# Patient Record
Sex: FEMALE | Employment: OTHER | URBAN - METROPOLITAN AREA
[De-identification: names, ages, dates, MRNs, and addresses within clinical notes are randomized per-mention and may not be internally consistent; named-entity substitution may affect disease eponyms.]

---

## 2016-12-24 ASSESSMENT — ENCOUNTER SYMPTOMS
SLEEP DISTURBANCES DUE TO BREATHING: 0
CHILLS: 0
EYE IRRITATION: 1
HALLUCINATIONS: 0
CLAUDICATION: 0
LEG SWELLING: 0
DOUBLE VISION: 0
DIFFICULTY URINATING: 1
EYE REDNESS: 1
ARTHRALGIAS: 0
LIGHT-HEADEDNESS: 1
FLANK PAIN: 0
STIFFNESS: 0
DYSURIA: 0
HYPOTENSION: 1
MYALGIAS: 1
ALTERED TEMPERATURE REGULATION: 1
MUSCLE CRAMPS: 1
LEG PAIN: 1
INCREASED ENERGY: 1
HYPERTENSION: 0
MUSCLE WEAKNESS: 0
DECREASED APPETITE: 0
WEIGHT LOSS: 0
FEVER: 0
POLYDIPSIA: 0
NIGHT SWEATS: 1
POLYPHAGIA: 0
TACHYCARDIA: 0
NECK PAIN: 1
JOINT SWELLING: 0
SYNCOPE: 0
BACK PAIN: 1
EYE PAIN: 0
FATIGUE: 1
PALPITATIONS: 1
EYE WATERING: 0
HEMATURIA: 0
EXERCISE INTOLERANCE: 0
ORTHOPNEA: 0
WEIGHT GAIN: 0

## 2016-12-24 ASSESSMENT — ACTIVITIES OF DAILY LIVING (ADL)
DO_MEMBERS_OF_YOUR_HOUSEHOLD_USE_SAFETY_HELMETS?: Y
ARE_THERE_SMOKE_DETECTORS_IN_YOUR_HOME?: Y
DO_MEMBERS_OF_YOUR_HOUSEHOLD_USE_SUNSCREEN?: Y
DO_MEMBERS_OF_YOUR_HOUSEHOLD_WEAR_SEAT_BELTS?: Y
ARE_THERE_FIREARMS_IN_YOUR_HOME?: N

## 2017-01-02 ENCOUNTER — OFFICE VISIT (OUTPATIENT)
Dept: INTERNAL MEDICINE | Facility: CLINIC | Age: 67
End: 2017-01-02

## 2017-01-02 VITALS
HEART RATE: 82 BPM | WEIGHT: 125.7 LBS | BODY MASS INDEX: 22.27 KG/M2 | DIASTOLIC BLOOD PRESSURE: 75 MMHG | TEMPERATURE: 98.3 F | OXYGEN SATURATION: 99 % | SYSTOLIC BLOOD PRESSURE: 121 MMHG | HEIGHT: 63 IN

## 2017-01-02 DIAGNOSIS — R42 DIZZINESS: Primary | ICD-10-CM

## 2017-01-02 DIAGNOSIS — Z11.59 NEED FOR HEPATITIS C SCREENING TEST: ICD-10-CM

## 2017-01-02 DIAGNOSIS — D72.819 LEUKOPENIA, UNSPECIFIED TYPE: ICD-10-CM

## 2017-01-02 DIAGNOSIS — E29.1 ANDROGEN DEFICIENCY: ICD-10-CM

## 2017-01-02 RX ORDER — DIPHENOXYLATE HYDROCHLORIDE AND ATROPINE SULFATE 2.5; .025 MG/1; MG/1
1 TABLET ORAL DAILY
COMMUNITY
Start: 2015-10-19

## 2017-01-02 ASSESSMENT — ENCOUNTER SYMPTOMS
MEMORY LOSS: 0
BLOOD IN STOOL: 0
SWOLLEN GLANDS: 0
FATIGUE: 1
SKIN CHANGES: 0
WEIGHT LOSS: 0
COUGH: 0
POLYPHAGIA: 0
NAUSEA: 0
LOSS OF CONSCIOUSNESS: 0
POLYDIPSIA: 0
FEVER: 0
EXERCISE INTOLERANCE: 0
LEG PAIN: 1
POOR WOUND HEALING: 0
MYALGIAS: 1
PALPITATIONS: 1
LEG SWELLING: 0
SORE THROAT: 0
SMELL DISTURBANCE: 0
SPUTUM PRODUCTION: 0
HOT FLASHES: 0
ORTHOPNEA: 0
EYE REDNESS: 1
FLANK PAIN: 0
EYE PAIN: 0
ABDOMINAL PAIN: 0
SEIZURES: 0
WEIGHT GAIN: 0
SNORES LOUDLY: 0
EYE WATERING: 0
HOARSE VOICE: 0
PANIC: 0
MUSCLE WEAKNESS: 0
DIZZINESS: 0
DECREASED APPETITE: 0
CONSTIPATION: 0
NECK PAIN: 1
DIFFICULTY URINATING: 1
ALTERED TEMPERATURE REGULATION: 1
DIARRHEA: 0
CLAUDICATION: 0
HALLUCINATIONS: 0
DOUBLE VISION: 0
STIFFNESS: 0
JAUNDICE: 0
TREMORS: 0
SINUS PAIN: 0
HEARTBURN: 0
RESPIRATORY PAIN: 0
WHEEZING: 0
DYSPNEA ON EXERTION: 0
SINUS CONGESTION: 0
DYSURIA: 0
BRUISES/BLEEDS EASILY: 0
HEMATURIA: 0
NUMBNESS: 0
LIGHT-HEADEDNESS: 1
BLOATING: 0
SLEEP DISTURBANCES DUE TO BREATHING: 0
MUSCLE CRAMPS: 1
SYNCOPE: 0
NERVOUS/ANXIOUS: 0
HEADACHES: 0
HYPOTENSION: 1
VOMITING: 0
HYPERTENSION: 0
NAIL CHANGES: 0
COUGH DISTURBING SLEEP: 0
RECTAL BLEEDING: 0
TASTE DISTURBANCE: 0
SHORTNESS OF BREATH: 0
DEPRESSION: 0
HEMOPTYSIS: 0
PARALYSIS: 0
TROUBLE SWALLOWING: 0
BREAST MASS: 0
POSTURAL DYSPNEA: 0
EXTREMITY NUMBNESS: 0
JOINT SWELLING: 0
BREAST PAIN: 0
WEAKNESS: 0
ARTHRALGIAS: 0
EYE IRRITATION: 1
DECREASED LIBIDO: 0
TACHYCARDIA: 0
SPEECH CHANGE: 0
BACK PAIN: 1
DECREASED CONCENTRATION: 0
NECK MASS: 0
DISTURBANCES IN COORDINATION: 0
CHILLS: 0
TINGLING: 0
NIGHT SWEATS: 1
BOWEL INCONTINENCE: 0
INCREASED ENERGY: 1
RECTAL PAIN: 0
INSOMNIA: 0

## 2017-01-02 NOTE — MR AVS SNAPSHOT
After Visit Summary   1/2/2017    Roberta Joseph    MRN: [id number]           Patient Information     Date Of Birth          1950        Visit Information        Provider Department      1/2/2017 6:30 PM Natasha Nava MD [facility]        Today's Diagnoses     Dizziness    -  1     Need for hepatitis C screening test         Leukopenia, unspecified type         Androgen deficiency           Care Instructions    Primary Care Center Medication Refill Request Information:  * Please contact your pharmacy regarding ANY request for medication refills.  ** [facility] Prescription Fax = [phone]  * Please allow 3 business days for routine medication refills.  * Please allow 5 business days for controlled substance medication refills.     Primary Care Center Test Result notification information:  *You will be notified with in 7-10 days of your appointment day regarding the results of your test.  If you are on MyChart you will be notified as soon as the provider has reviewed the results and signed off on them.          Follow-ups after your visit        Your next 10 appointments already scheduled     Jan 09, 2017  1:30 PM   NEW GENERAL with Oneida Eaton MD   Eye Clinic ([facility])    [facility]  [address]  [facility] [address]   [phone]              Future tests that were ordered for you today     Open Future Orders        Priority Expected Expires Ordered    Hepatitis C Screen Reflex to HCV RNA Quant and Genotype Routine 1/2/2017 1/2/2018 1/2/2017    CBC with platelets differential Routine  1/2/2018 1/2/2017    Cortisol Routine  1/2/2018 1/2/2017    Adrenal corticotropin Routine  1/3/2018 1/2/2017    Aldosterone Routine  1/3/2018 1/2/2017    Human growth hormone Routine  1/3/2018 1/2/2017    Growth hormone binding protein Routine  1/3/2018 1/2/2017    CRP inflammation Routine  1/2/2018 1/2/2017    Erythrocyte  "sedimentation rate auto Routine  1/2/2018 1/2/2017    Antinuclear antibody screen by EIA Routine  1/2/2018 1/2/2017            Who to contact     Please call your clinic at [phone] to:    Ask questions about your health    Make or cancel appointments    Discuss your medicines    Learn about your test results    Speak to your doctor   If you have compliments or concerns about an experience at your clinic, or if you wish to file a complaint, please contact [facility] Patient Relations at [phone] or email us at [email or web address]         Additional Information About Your Visit        [facility] Information     [facility] gives you secure access to your electronic health record. If you see a primary care provider, you can also send messages to your care team and make appointments. If you have questions, please call your primary care clinic.  If you do not have a primary care provider, please call [phone] and they will assist you.      [facility] is an electronic gateway that provides easy, online access to your medical records. With [facility], you can request a clinic appointment, read your test results, renew a prescription or communicate with your care team.     To access your existing account, please contact your [facility] Clinic or call [phone] for assistance.        Your Vitals Were     Pulse Temperature Height BMI (Body Mass Index) Pulse Oximetry Breastfeeding?    82 98.3  F (36.8  C) (Oral) 1.597 m (5' 2.87\") 22.36 kg/m2 99% No       Blood Pressure from Last 3 Encounters:   01/02/17 121/75    Weight from Last 3 Encounters:   01/02/17 57.017 kg (125 lb 11.2 oz)               Primary Care Provider    [facility]       No address on file        Thank you!     Thank you for choosing [facility]  for your care. Our goal is always to provide you with excellent care. Hearing back from our patients is one " way we can continue to improve our services. Please take a few minutes to complete the written survey that you may receive in the mail after your visit with us. Thank you!             Your Updated Medication List - Protect others around you: Learn how to safely use, store and throw away your medicines at www.disposemymeds.org.          This list is accurate as of: 1/2/17  6:48 PM.  Always use your most recent med list.                   Brand Name Dispense Instructions for use    acetaminophen 325 MG tablet    TYLENOL         CALCITRATE 950 MG tablet   Generic drug:  calcium citrate          [employer] CALCIUM CITRATE +D3 MINI 200-250 MG-UNIT Tabs   Generic drug:  Calcium Citrate-Vitamin D      Take 2 tablets by mouth 2 times daily       eflornithine HCl 13.9 % Crea    VANIQA    30 g    Apply a thin layer once daily to the affected area with hair growth twice daily 8 hours apart       EFUDEX 5 % cream   Generic drug:  fluorouracil          metroNIDAZOLE 0.75 % topical gel    METROGEL         MULTI-VITAMINS Tabs      Take 1 tablet by mouth daily       MULTIVITAMIN ADULT PO          PARoxetine 10 MG tablet    PAXIL

## 2017-01-03 DIAGNOSIS — D72.819 LEUKOPENIA, UNSPECIFIED TYPE: ICD-10-CM

## 2017-01-03 DIAGNOSIS — R42 DIZZINESS: ICD-10-CM

## 2017-01-03 DIAGNOSIS — E29.1 ANDROGEN DEFICIENCY: ICD-10-CM

## 2017-01-03 DIAGNOSIS — Z11.59 NEED FOR HEPATITIS C SCREENING TEST: ICD-10-CM

## 2017-01-03 LAB
ACTH PLAS-MCNC: <10 PG/ML
BASOPHILS # BLD AUTO: 0.1 10E9/L (ref 0–0.2)
BASOPHILS NFR BLD AUTO: 1.3 %
CORTIS SERPL-MCNC: 9.1 UG/DL (ref 4–22)
CRP SERPL-MCNC: <2.9 MG/L (ref 0–8)
DIFFERENTIAL METHOD BLD: NORMAL
EOSINOPHIL # BLD AUTO: 0.5 10E9/L (ref 0–0.7)
EOSINOPHIL NFR BLD AUTO: 11.2 %
ERYTHROCYTE [DISTWIDTH] IN BLOOD BY AUTOMATED COUNT: 11.9 % (ref 10–15)
ERYTHROCYTE [SEDIMENTATION RATE] IN BLOOD BY WESTERGREN METHOD: 26 MM/H (ref 0–30)
GH SERPL-MCNC: 1.6 UG/L (ref 0–8)
HCT VFR BLD AUTO: 37.1 % (ref 35–47)
HCV AB SERPL QL IA: NORMAL
HGB BLD-MCNC: 12.1 G/DL (ref 11.7–15.7)
IMM GRANULOCYTES # BLD: 0 10E9/L (ref 0–0.4)
IMM GRANULOCYTES NFR BLD: 0.2 %
LYMPHOCYTES # BLD AUTO: 1.2 10E9/L (ref 0.8–5.3)
LYMPHOCYTES NFR BLD AUTO: 27.7 %
MCH RBC QN AUTO: 31.1 PG (ref 26.5–33)
MCHC RBC AUTO-ENTMCNC: 32.6 G/DL (ref 31.5–36.5)
MCV RBC AUTO: 95 FL (ref 78–100)
MONOCYTES # BLD AUTO: 0.5 10E9/L (ref 0–1.3)
MONOCYTES NFR BLD AUTO: 10 %
NEUTROPHILS # BLD AUTO: 2.2 10E9/L (ref 1.6–8.3)
NEUTROPHILS NFR BLD AUTO: 49.6 %
NRBC # BLD AUTO: 0 10*3/UL
NRBC BLD AUTO-RTO: 0 /100
PLATELET # BLD AUTO: 161 10E9/L (ref 150–450)
RBC # BLD AUTO: 3.89 10E12/L (ref 3.8–5.2)
WBC # BLD AUTO: 4.5 10E9/L (ref 4–11)

## 2017-01-03 NOTE — NURSING NOTE
Chief Complaint   Patient presents with     Establish Care     Patient here to establish care.       Rowan Cobos CMA at 6:16 PM on 1/2/2017.

## 2017-01-03 NOTE — PROGRESS NOTES
HPI:       Roberta Joseph is a 66 year old female who presents for the following  Patient presents with: Establish Care    Roberta Joseph is a 66 yoF who is here to establish care and also to have an evaluation of a low DHEAS level that was checked by dermatology. This was checked b/c of increased facial hair. There was concern based on this lab for possible adrenal insufficiency. Does feels shaky/weak at times. Is cold a lot. Struggles with dizziness/lightheadedness. Does wake up in the night drenched in sweat. This has been occurring for the past couple years.     Problem, Medication and Allergy Lists were reviewed and are current.  Patient is a new patient to this clinic and so  I reviewed/updated the Past Medical History, the Family History and the Social History.     Retired //, .          Review of Systems:   Review of Systems     Constitutional:  Positive for fatigue, night sweats and increased energy. Negative for fever, chills, weight loss, weight gain, decreased appetite, recent stressors, height loss, post-operative complications, incisional pain, hallucinations, hyperactivity and confused.   HENT:  Negative for ear pain, hearing loss, tinnitus, nosebleeds, trouble swallowing, hoarse voice, mouth sores, sore throat, ear discharge, tooth pain, gum tenderness, taste disturbance, smell disturbance, hearing aid, bleeding gums, dry mouth, sinus pain, sinus congestion and neck mass.    Eyes:  Positive for redness, eye dryness, floaters and eye irritation. Negative for double vision, pain, eye pain, decreased vision, eye watering, eye bulging, flashing lights, spots, strabismus, tunnel vision and jaundice.   Respiratory:   Negative for cough, hemoptysis, sputum production, shortness of breath, wheezing, sleep disturbances due to breathing, snores loudly, respiratory pain, dyspnea on exertion, cough disturbing sleep and postural dyspnea.    Cardiovascular:  Positive for  palpitations, hypotension, few scattered varicosities, leg pain and light-headedness. Negative for chest pain, dyspnea on exertion, orthopnea, claudication, leg swelling, fingers/toes turn blue, hypertension, syncope, history of heart murmur, chest pain on exertion, chest pain at rest, pacemaker, sleep disturbances due to breathing, tachycardia, exercise intolerance and edema.   Gastrointestinal:  Negative for heartburn, nausea, vomiting, abdominal pain, diarrhea, constipation, blood in stool, melena, rectal pain, bloating, hemorrhoids, bowel incontinence, jaundice, rectal bleeding, coffee ground emesis and change in stool.   Genitourinary:  Positive for urgency and difficulty urinating. Negative for bladder incontinence, dysuria, hematuria, flank pain, vaginal discharge, genital sores, dyspareunia, decreased libido, nocturia, voiding less frequently, arousal difficulty, abnormal vaginal bleeding, excessive menstruation, menstrual changes, hot flashes, vaginal dryness and postmenopausal bleeding.   Musculoskeletal:  Positive for myalgias, back pain, muscle cramps and neck pain. Negative for joint swelling, arthralgias, stiffness, bone pain, muscle weakness and fracture.   Skin:  Negative for nail changes, itching, poor wound healing, rash, hair changes, skin changes, acne, warts, poor wound healing, scarring, flaky skin, Raynaud's phenomenon, sensitivity to sunlight and skin thickening.   Neurological:  Positive for light-headedness. Negative for dizziness, tingling, tremors, speech change, seizures, loss of consciousness, weakness, numbness, headaches, disturbances in coordination, extremity numbness, memory loss, difficulty walking and paralysis.   Endo/Heme:  Negative for anemia, swollen glands and bruises/bleeds easily.   Psychiatric/Behavioral:  Negative for depression, hallucinations, memory loss, decreased concentration, mood swings and panic attacks.    Breast:  Negative for breast discharge, breast mass,  "breast pain and nipple retraction.   Endocrine:  Positive for altered temperature regulation.Negative for polyphagia, polydipsia, unwanted hair growth and change in facial hair.            Physical Exam:   /75 mmHg  Pulse 82  Temp(Src) 98.3  F (36.8  C) (Oral)  Ht 1.597 m (5' 2.87\")  Wt 57.017 kg (125 lb 11.2 oz)  BMI 22.36 kg/m2  SpO2 99%  Breastfeeding? No  Body mass index is 22.36 kg/(m^2).  Vitals were reviewed     Physical Exam   Constitutional: She is oriented to person, place, and time and well-developed, well-nourished, and in no distress. No distress.   HENT:   Head: Normocephalic and atraumatic.   Right Ear: Tympanic membrane normal.   Left Ear: Tympanic membrane normal.   Mouth/Throat: Oropharynx is clear and moist.   Eyes: Conjunctivae and EOM are normal. Pupils are equal, round, and reactive to light. No scleral icterus.   Neck: Neck supple. No thyromegaly present.   Cardiovascular: Normal rate, regular rhythm, normal heart sounds and intact distal pulses.  Exam reveals no gallop and no friction rub.    No murmur heard.  Pulmonary/Chest: Effort normal and breath sounds normal. No respiratory distress. She has no wheezes.   Abdominal: Soft. Bowel sounds are normal. She exhibits no distension. There is no tenderness.   Musculoskeletal: Normal range of motion. She exhibits no edema.   Lymphadenopathy:     She has no cervical adenopathy.   Neurological: She is alert and oriented to person, place, and time. She exhibits normal muscle tone. Gait normal. Coordination normal.   Skin: Skin is warm and dry. No rash noted. She is not diaphoretic.   Psychiatric: Mood and affect normal.           Results:     Pending, available in EPIC   Assessment and Plan     Roberta was seen today for [facility] care.. I suspect her low DHEAS level is of no clinical significance. She doesn't really have any other significant signs of adrenal insufficiency, although some minor symptoms may relate, so we will rule this " out. She also doesn't have any reasons that concern me regarding the night sweats (no fevers/chills, no weight changes, etc)    Diagnoses and all orders for this visit:    Dizziness  -     Cortisol; Future  -     Adrenal corticotropin; Future  -     Aldosterone; Future  -     Human growth hormone; Future  -     Growth hormone binding protein; Future  -     CRP inflammation; Future  -     Erythrocyte sedimentation rate auto; Future  -     Antinuclear antibody screen by EIA; Future    Need for hepatitis C screening test  -     Hepatitis C Screen Reflex to HCV RNA Quant and Genotype; Future    Leukopenia, unspecified type  -     CBC with platelets differential; Future    Androgen deficiency  -     Cortisol; Future  -     Adrenal corticotropin; Future  -     Aldosterone; Future  -     Human growth hormone; Future  -     Growth hormone binding protein; Future  -     CRP inflammation; Future  -     Erythrocyte sedimentation rate auto; Future  -     Antinuclear antibody screen by EIA; Future        Options for treatment and follow-up care were reviewed with the patient. Roberta Joseph engaged in the decision making process and verbalized understanding of the options discussed and agreed with the final plan.    Natasha Belcher MD  Jan 2, 2017

## 2017-01-03 NOTE — PATIENT INSTRUCTIONS
Primary Care Center Medication Refill Request Information:  * Please contact your pharmacy regarding ANY request for medication refills.  ** [facility] Prescription Fax = [phone]  * Please allow 3 business days for routine medication refills.  * Please allow 5 business days for controlled substance medication refills.     Primary Care Center Test Result notification information:  *You will be notified with in 7-10 days of your appointment day regarding the results of your test.  If you are on MyChart you will be notified as soon as the provider has reviewed the results and signed off on them.

## 2017-01-04 LAB
ALDOST SERPL-MCNC: 7.8 NG/DL
ANA SER QL IA: NORMAL

## 2017-01-09 ENCOUNTER — OFFICE VISIT (OUTPATIENT)
Dept: OPHTHALMOLOGY | Facility: CLINIC | Age: 67
End: 2017-01-09
Attending: OPHTHALMOLOGY
Payer: MEDICARE

## 2017-01-09 DIAGNOSIS — H52.4 PRESBYOPIA: ICD-10-CM

## 2017-01-09 DIAGNOSIS — H40.033 ANATOMICAL NARROW ANGLE OF BOTH EYES: ICD-10-CM

## 2017-01-09 DIAGNOSIS — H04.123 DRY EYES, BILATERAL: Primary | ICD-10-CM

## 2017-01-09 DIAGNOSIS — H52.203 HYPEROPIC ASTIGMATISM OF BOTH EYES: ICD-10-CM

## 2017-01-09 PROCEDURE — 99214 OFFICE O/P EST MOD 30 MIN: CPT | Mod: ZF

## 2017-01-09 PROCEDURE — 92015 DETERMINE REFRACTIVE STATE: CPT | Mod: ZF

## 2017-01-09 ASSESSMENT — CONF VISUAL FIELD
OS_NORMAL: 1
OD_NORMAL: 1

## 2017-01-09 ASSESSMENT — REFRACTION_WEARINGRX
OD_CYLINDER: +0.50
OS_ADD: +2.50
OS_AXIS: 120
OD_SPHERE: +3.25
OD_ADD: +2.50
OD_AXIS: 150
OS_SPHERE: +2.00
OS_CYLINDER: +1.00

## 2017-01-09 ASSESSMENT — GONIOSCOPY
OS_TEMPORAL: C35B
OD_NASAL: C35B
OS_INFERIOR: C35B
OD_INFERIOR: C35B
OD_TEMPORAL: C35B
OS_SUPERIOR: C35B
OS_NASAL: C35B
OD_SUPERIOR: C35B

## 2017-01-09 ASSESSMENT — REFRACTION_MANIFEST
OS_CYLINDER: +0.75
OD_CYLINDER: +0.50
OS_AXIS: 124
OD_ADD: +2.75
OS_ADD: +2.75
OS_SPHERE: +1.25
OD_AXIS: 155
OD_SPHERE: +2.75

## 2017-01-09 ASSESSMENT — EXTERNAL EXAM - LEFT EYE: OS_EXAM: NORMAL

## 2017-01-09 ASSESSMENT — TONOMETRY
OS_IOP_MMHG: 16
OD_IOP_MMHG: 17
IOP_METHOD: TONOPEN

## 2017-01-09 ASSESSMENT — EXTERNAL EXAM - RIGHT EYE: OD_EXAM: NORMAL

## 2017-01-09 ASSESSMENT — SLIT LAMP EXAM - LIDS
COMMENTS: MGD
COMMENTS: MGD

## 2017-01-09 ASSESSMENT — VISUAL ACUITY
OD_CC: 20/20
OS_CC: 20/25
METHOD: SNELLEN - LINEAR
OD_CC+: -1
OS_CC+: +/-
CORRECTION_TYPE: GLASSES

## 2017-01-09 ASSESSMENT — CUP TO DISC RATIO
OD_RATIO: 0.3
OS_RATIO: 0.4

## 2017-01-09 NOTE — NURSING NOTE
Chief Complaints and History of Present Illnesses   Patient presents with     Annual Eye Exam     decreased vision OU     HPI    Affected eye(s):  Both   Symptoms:     No blurred vision   Decreased vision   Dryness   Itching         Do you have eye pain now?:  No      Comments:  Pt has noticed she has to get closer to road signs to read them than she used to.  OU get dry and itchy. Does use lubricant tears but they don't seem to help much. Hot packs seem to help.  Esther Adams COA 1:43 PM January 9, 2017

## 2017-01-09 NOTE — PROGRESS NOTES
HPI  Roberta Joseph is a 66 year old female here for full eye exam. She notes bilateral eye dryness and redness. This has been present for many years. There is associated eye redness, most noticeable in the inner corner of the right eye. If she uses artificial tears and warm compresses, the eyes feel more comfortable, but it doesn't change the redness much. Having a humidifier in their home also helps. No flashes/floaters.    Her dermatologist in [place] was treating her for rosacea and mentioned she may have ocular rosacea. She is currently using topical metrogel on her face for this.    Assessment & Plan      (H04.123) Dry eyes, bilateral  (primary encounter diagnosis)  Comment: Largely evaporative with MGD and possible ocular rosacea.  Plan: Start with artificial tears, warm compresses, and fish oil. Can consider oral doxy or topical FML if not improved.    (H40.033) Anatomical narrow angle of both eyes  Comment: Not occludable on gonioscopy today.  Plan: Observe    (H52.213) Hyperopic astigmatism of both eyes  (H52.4) Presbyopia  Comment: Good vision with refraction  Plan: Given updated glasses Rx.      -----------------------------------------------------------------------------------    Patient disposition:   Return in about 1 year (around 1/9/2018). or sooner as needed.    Teaching statement:  I have confirmed the content of the chief complaint, history of present illness, review of systems, and past medical/surgical history sections as documented by others and edited the information as needed.    I have interviewed and examined the patient and confirm the pertinent findings.  I agree with the findings and plan as documented.    Oneida Eaton MD  Comprehensive Ophthalmology & Ocular Pathology  Department of Ophthalmology and Visual Neurosciences  [email or web address]  Pager [phone]

## 2017-01-11 LAB — Lab: NORMAL

## 2020-03-10 ENCOUNTER — HEALTH MAINTENANCE LETTER (OUTPATIENT)
Age: 70
End: 2020-03-10

## 2020-12-27 ENCOUNTER — HEALTH MAINTENANCE LETTER (OUTPATIENT)
Age: 70
End: 2020-12-27

## 2021-04-24 ENCOUNTER — HEALTH MAINTENANCE LETTER (OUTPATIENT)
Age: 71
End: 2021-04-24

## 2021-10-09 ENCOUNTER — HEALTH MAINTENANCE LETTER (OUTPATIENT)
Age: 71
End: 2021-10-09

## 2022-03-20 ENCOUNTER — HEALTH MAINTENANCE LETTER (OUTPATIENT)
Age: 72
End: 2022-03-20

## 2022-03-22 ENCOUNTER — TRANSFERRED RECORDS (OUTPATIENT)
Dept: HEALTH INFORMATION MANAGEMENT | Facility: CLINIC | Age: 72
End: 2022-03-22

## 2022-05-16 ENCOUNTER — HEALTH MAINTENANCE LETTER (OUTPATIENT)
Age: 72
End: 2022-05-16

## 2022-08-18 NOTE — PROGRESS NOTES
[facility] Dermatology Note  Encounter Date: Aug 23, 2022  Office Visit     Dermatology Problem List:  1.  NMSC  -BCC, left medial cheek/nasolabial fold, s/p biopsy 8/29/2012, s/p excision 9/10/2013  -SCC, right nose, s/p Mohs 6/1996  2. Actinic keratosis  -s/p cryotherapy  -Efudex (initiated 7/22/2008, has used multiple times since start date)  3. Rosacea  -Metrogel (initited 7/5/2007)  4. # Hirsutism - s/p vaniqa. 2016, normal work up        ____________________________________________    Assessment & Plan:    # History of nonmelanoma skin cancer  -yearly skin screenings recommended, see derm consult  -Discussed risks and benefits of blue light therapy for AKs as per her request. I did not see AKs today. Including but not limited to discomfort and pain with procedure, time for procedure, need for avoidance of sun exposure after treatment, expected irritation after treatment, risk of exuberant reaction.        # COVID in August  (Positive for both PCR and antigen tests saturday). Pt reports paxlovid rebound and congested  - After discussion and noted congestion we decided to bring her back to check these nasal spots within 2-4 weeks.     Procedures Performed:   - Cryotherapy procedure note, location(s): right chest and left forehead. After verbal consent and discussion of risks and benefits including, but not limited to, dyspigmentation/scar, blister, and pain, 2 lesion(s) was(were) treated with 1-2 mm freeze border for 1-2 cycles with liquid nitrogen. Post cryotherapy instructions were provided.    Follow-up: as scheduled    Staff and Scribe:     Scribe Disclosure:   I, Yosef Restrepo, am serving as a scribe to document services personally performed by this physician, Dr. Meka Holbrook, based on data collection and the provider's statements to me.     Provider Disclosure:   The documentation recorded by the scribe accurately reflects the services I personally performed and the decisions made by  me.    Meka Holbrook MD    Department of Dermatology  [facility]: Phone: [phone], Fax:[phone]  [facility]: Phone: [phone], Fax: [phone]      ____________________________________________    CC: Derm Problem (Concerned about dry flaky patches, rough spots, white heads, and lumps. Recheck right side of nose S/p Mohs for SCC DOS: 06/1996./Dr. Lillie Martinez recommended blue light therapy.  She would like a second opinion. )    HPI:  Ms. Roberta Joseph is a(n) 72 year old female who presents today as a return patient for a spot check.     Last seen on 12/15/16 for a skin check.     Today, patient notes dry flaky patches on the face. Notes concerning lesion on the nose near previous site of SCC. Notes rough concern on the upper lip, peeling skin on the cheeks, and pimple like growth on the left chin.     Has not been seen since 2016 here in our facility. Has been seeing dermatology in [place].     Patient has concern about blue light therapy recommended by Dr. Moreira. Notes Dr. Moreira found precancers on the face. Last saw her in early 2022. Has COVID in August (PCR and antigen tests).     Patient is otherwise feeling well, without additional skin concerns.    Labs Reviewed:  N/A    Physical Exam:  Vitals: There were no vitals taken for this visit.  SKIN: Focused examination of forehead and chest was performed.Pt congested  - There are tan to brown waxy stuck on papules with surrounding erythema on the right chest and left forehead.    - No other lesions of concern on areas examined.     Medications:  Current Outpatient Medications   Medication     acetaminophen (TYLENOL) 325 MG tablet     calcium citrate (CALCITRATE) 950 MG tablet     Calcium Citrate-Vitamin D (CVS CALCIUM CITRATE +D3 MINI) 200-250 MG-UNIT TABS     eflornithine HCl (VANIQA) 13.9 % CREA     eflornithine HCl  13.9 % CREA     fluorouracil (EFUDEX) 5 % cream     metroNIDAZOLE (METROGEL) 0.75 % topical gel     Multiple Vitamin (MULTI-VITAMINS) TABS     Multiple Vitamins-Minerals (MULTIVITAMIN ADULT PO)     PARoxetine (PAXIL) 10 MG tablet     No current facility-administered medications for this visit.      Past Medical History:   Patient Active Problem List   Diagnosis     Dry eyes, bilateral     Hyperopic astigmatism of both eyes     Presbyopia     Past Medical History:   Diagnosis Date     Basal cell carcinoma     2013, left medial cheek     Depression      NCGS (non-celiac gluten sensitivity)      Osteoporosis      Squamous cell carcinoma     s/p Mohs 1996        CC Referred Self, MD  No address on file on close of this encounter.

## 2022-08-22 ENCOUNTER — NURSE TRIAGE (OUTPATIENT)
Dept: NURSING | Facility: CLINIC | Age: 72
End: 2022-08-22

## 2022-08-22 NOTE — TELEPHONE ENCOUNTER
No need for patient to reschedule.  She will mask as guidelines indicate and staff can wear appropriate PPE.  Jessica Ramirez RN     Intermediate / Complex Repair - Final Wound Length In Cm: 4

## 2022-08-22 NOTE — TELEPHONE ENCOUNTER
RN called patient.  NO sx at this time and feeling well.  Advised will need to wear mask, and will wear or PPE.  Pt agreed with plan will come for visit tomorrow.  She has completed a round of quarantine already and stated this could be rebound Covid from the Paxlovid treatment.  Pt agreed with plan and will come for the visit.      Jessica Ramirez RN

## 2022-08-22 NOTE — TELEPHONE ENCOUNTER
Had covid and was put on paxlovid. Finished the paxlovid and since then having some symptoms again so she retested Saturday and was positive again after testing negative.  Rebound covid from being on paxlovid? She does report symptoms are improving daily.    She is asking if she should be seen in clinic tomorrow for derm appointment?  [facility] says to retest and quarantine and  says she already completed one quarantine so she just needs to wear a mask.    Would nya like her to reschedule?    Please have care team return her call if she needs to reschedule or not.    Fariha Sanchez RN  [facility] Nurse Advisor        Reason for Disposition    [1] PERSISTING SYMPTOMS OF COVID-19 AND [2] symptoms BETTER (improving)    Additional Information    Negative: SEVERE difficulty breathing (e.g., struggling for each breath, speaks in single words)    Negative: [1] SEVERE weakness (e.g., can't stand or can barely walk) AND [2] new-onset or WORSE    Negative: Difficult to awaken or acting confused (e.g., disoriented, slurred speech)    Negative: Bluish (or gray) lips or face now    Negative: Sounds like a life-threatening emergency to the triager    Negative: [1] MODERATE difficulty breathing (e.g., speaks in phrases, SOB even at rest, pulse 100-120) AND [2] new-onset or WORSE    Negative: [1] MODERATE difficulty breathing AND [2] oxygen level (e.g., pulse oximetry) 91 to 94 percent    Negative: Oxygen level (e.g., pulse oximetry) 90 percent or lower    Negative: MODERATE difficulty breathing (e.g., speaks in phrases, SOB even at rest, pulse 100-120)    Negative: [1] Drinking very little AND [2] dehydration suspected (e.g., no urine > 12 hours, very dry mouth, very lightheaded)    Negative: Patient sounds very sick or weak to the triager    Negative: [1] MILD difficulty breathing (e.g., minimal/no SOB at rest, SOB with walking, pulse <100) AND [2] new-onset    Negative: Oxygen level (e.g., pulse oximetry) 91 to 94  percent    Negative: [1] PERSISTING SYMPTOMS OF COVID-19 AND [2] NEW symptom AND [3] could be serious    Negative: [1] Caller has URGENT question AND [2] triager unable to answer question    Negative: [1] PERSISTING SYMPTOMS OF COVID-19 AND [2] symptoms WORSE    Negative: [1] Caller has NON-URGENT question AND [2] triager unable to answer    Negative: [1] PERSISTING SYMPTOMS OF COVID-19 AND [2] NO medical visit for COVID-19 in past 2 weeks    Negative: Patient wants to be seen    Negative: [1] PERSISTING SYMPTOMS OF COVID-19 AND [2] symptoms SAME AND [3] medical visit for COVID-19 in past 2 weeks    Protocols used: CORONAVIRUS (COVID-19) PERSISTING SYMPTOMS FOLLOW-UP CALL-A-OH

## 2022-08-23 ENCOUNTER — OFFICE VISIT (OUTPATIENT)
Dept: DERMATOLOGY | Facility: CLINIC | Age: 72
End: 2022-08-23
Payer: COMMERCIAL

## 2022-08-23 DIAGNOSIS — L82.0 SEBORRHEIC KERATOSIS, INFLAMED: Primary | ICD-10-CM

## 2022-08-23 DIAGNOSIS — L71.9 ROSACEA: ICD-10-CM

## 2022-08-23 DIAGNOSIS — L68.0 HIRSUTISM: ICD-10-CM

## 2022-08-23 PROCEDURE — 99202 OFFICE O/P NEW SF 15 MIN: CPT | Mod: 25 | Performed by: DERMATOLOGY

## 2022-08-23 PROCEDURE — 17110 DESTRUCTION B9 LES UP TO 14: CPT | Performed by: DERMATOLOGY

## 2022-08-23 RX ORDER — ESCITALOPRAM OXALATE 20 MG/1
20 TABLET ORAL
COMMUNITY
Start: 2021-08-31

## 2022-08-23 RX ORDER — NABUMETONE 500 MG/1
500 TABLET, FILM COATED ORAL
COMMUNITY
Start: 2022-03-15

## 2022-08-23 ASSESSMENT — PAIN SCALES - GENERAL: PAINLEVEL: NO PAIN (0)

## 2022-08-23 NOTE — NURSING NOTE
Roberta Joseph's chief complaint for this visit includes:  Chief Complaint   Patient presents with     Derm Problem     Concerned about dry flaky patches, rough spots, white heads, and lumps. Recheck right side of nose S/p Mohs for SCC DOS: 06/1996.  Dr. Lillie Martinez recommended blue light therapy.  She would like a second opinion.      PCP: [facility]    Referring Provider:  Referred Self, MD  No address on file    There were no vitals taken for this visit.  No Pain (0)        Allergies   Allergen Reactions     Gluten Meal      Lactose GI Disturbance and Diarrhea         Do you need any medication refills at today's visit? No    Priya Arreola, CMA

## 2022-08-23 NOTE — PATIENT INSTRUCTIONS
Cryotherapy    What is it?  Use of a very cold liquid, such as liquid nitrogen, to freeze and destroy abnormal skin cells that need to be removed    What should I expect?  Tenderness and redness  A small blister that might grow and fill with dark purple blood. There may be crusting.  More than one treatment may be needed if the lesions do not go away.    How do I care for the treated area?  Gently wash the area with your hands when bathing.  Use a thin layer of Vaseline to help with healing. You may use a Band-Aid.   The area should heal within 7-10 days and may leave behind a pink or lighter color.   Do not use an antibiotic or Neosporin ointment.   You may take acetaminophen (Tylenol) for pain.     Call your doctor if you have:  Severe pain  Signs of infection (warmth, redness, cloudy yellow drainage, and or a bad smell)  Questions or concerns    Who should I call with questions?      [facility]: [phone]      [facility]: [phone]      For urgent needs outside of business hours call the [facility] at [phone] and ask for the dermatology resident on call

## 2022-08-23 NOTE — LETTER
8/23/2022         RE: Roberta Joseph  [address]  [address]  [place] [address]        Dear Colleague,    Thank you for referring your patient, Roberta Joseph, to the [facility]. Please see a copy of my visit note below.    [facility] Dermatology Note  Encounter Date: Aug 23, 2022  Office Visit     Dermatology Problem List:  1.  NMSC  -BCC, left medial cheek/nasolabial fold, s/p biopsy 8/29/2012, s/p excision 9/10/2013  -SCC, right nose, s/p Mohs 6/1996  2. Actinic keratosis  -s/p cryotherapy  -Efudex (initiated 7/22/2008, has used multiple times since start date)  3. Rosacea  -Metrogel (initited 7/5/2007)  4. # Hirsutism - s/p vaniqa. 2016, normal work up        ____________________________________________    Assessment & Plan:    # History of nonmelanoma skin cancer  -yearly skin screenings recommended, see derm consult  -Discussed risks and benefits of blue light therapy for AKs as per her request. I did not see AKs today. Including but not limited to discomfort and pain with procedure, time for procedure, need for avoidance of sun exposure after treatment, expected irritation after treatment, risk of exuberant reaction.        # COVID in August  (Positive for both PCR and antigen tests saturday). Pt reports paxlovid rebound and congested  - After discussion and noted congestion we decided to bring her back to check these nasal spots within 2-4 weeks.     Procedures Performed:   - Cryotherapy procedure note, location(s): right chest and left forehead. After verbal consent and discussion of risks and benefits including, but not limited to, dyspigmentation/scar, blister, and pain, 2 lesion(s) was(were) treated with 1-2 mm freeze border for 1-2 cycles with liquid nitrogen. Post cryotherapy instructions were provided.    Follow-up: as scheduled    Staff and Scribe:     Scribe Disclosure:   I, Yosef Restrepo, am serving as a scribe to document services personally  performed by this physician, Dr. Meka Holbrook, based on data collection and the provider's statements to me.     Provider Disclosure:   The documentation recorded by the scribe accurately reflects the services I personally performed and the decisions made by me.    Meka Holbrook MD    Department of Dermatology  [facility]: Phone: [phone], Fax:[phone]  [facility]: Phone: [phone], Fax: [phone]      ____________________________________________    CC: Derm Problem (Concerned about dry flaky patches, rough spots, white heads, and lumps. Recheck right side of nose S/p Mohs for SCC DOS: 06/1996./Dr. Lillie Martinez recommended blue light therapy.  She would like a second opinion. )    HPI:  Ms. Roberta Joseph is a(n) 72 year old female who presents today as a return patient for a spot check.     Last seen on 12/15/16 for a skin check.     Today, patient notes dry flaky patches on the face. Notes concerning lesion on the nose near previous site of SCC. Notes rough concern on the upper lip, peeling skin on the cheeks, and pimple like growth on the left chin.     Has not been seen since 2016 here in our facility. Has been seeing dermatology in [place].     Patient has concern about blue light therapy recommended by Dr. Moreira. Notes Dr. Moreira found precancers on the face. Last saw her in early 2022. Has COVID in August (PCR and antigen tests).     Patient is otherwise feeling well, without additional skin concerns.    Labs Reviewed:  N/A    Physical Exam:  Vitals: There were no vitals taken for this visit.  SKIN: Focused examination of forehead and chest was performed.Pt congested  - There are tan to brown waxy stuck on papules with surrounding erythema on the right chest and left forehead.    - No other lesions of concern on areas examined.     Medications:  Current Outpatient  Medications   Medication     acetaminophen (TYLENOL) 325 MG tablet     calcium citrate (CALCITRATE) 950 MG tablet     Calcium Citrate-Vitamin D ([employer] CALCIUM CITRATE +D3 MINI) 200-250 MG-UNIT TABS     eflornithine HCl (VANIQA) 13.9 % CREA     eflornithine HCl 13.9 % CREA     fluorouracil (EFUDEX) 5 % cream     metroNIDAZOLE (METROGEL) 0.75 % topical gel     Multiple Vitamin (MULTI-VITAMINS) TABS     Multiple Vitamins-Minerals (MULTIVITAMIN ADULT PO)     PARoxetine (PAXIL) 10 MG tablet     No current facility-administered medications for this visit.      Past Medical History:   Patient Active Problem List   Diagnosis     Dry eyes, bilateral     Hyperopic astigmatism of both eyes     Presbyopia     Past Medical History:   Diagnosis Date     Basal cell carcinoma     2013, left medial cheek     Depression      NCGS (non-celiac gluten sensitivity)      Osteoporosis      Squamous cell carcinoma     s/p Mohs 1996        CC Referred Self, MD  No address on file on close of this encounter.      Again, thank you for allowing me to participate in the care of your patient.        Sincerely,        Meka Holbrook MD

## 2022-09-09 ENCOUNTER — OFFICE VISIT (OUTPATIENT)
Dept: DERMATOLOGY | Facility: CLINIC | Age: 72
End: 2022-09-09
Payer: COMMERCIAL

## 2022-09-09 DIAGNOSIS — L73.8 SEBACEOUS HYPERPLASIA: ICD-10-CM

## 2022-09-09 DIAGNOSIS — L72.0 MILIA: Primary | ICD-10-CM

## 2022-09-09 PROCEDURE — 99213 OFFICE O/P EST LOW 20 MIN: CPT | Performed by: DERMATOLOGY

## 2022-09-09 NOTE — NURSING NOTE
Roberta Joseph's goals for this visit include:   Chief Complaint   Patient presents with     Derm Problem     Area of concern on face/ under mask      She requests these members of her care team be copied on today's visit information:     PCP: [facility]    Referring Provider:  No referring provider defined for this encounter.    There were no vitals taken for this visit.    Do you need any medication refills at today's visit? No  Cecilia Chicas, CMA on 9/9/2022 at 2:14 PM

## 2022-09-09 NOTE — PROGRESS NOTES
[facility] Dermatology Note  Encounter Date: Sep 9, 2022  Office Visit     Dermatology Problem List:  1. History of NMSC  - BCC, left medial cheek/nasolabial fold, s/p biopsy 8/29/2012, s/p excision 9/10/2013  - SCC, right nose, s/p Mohs 6/1996  2. Actinic keratosis  -s/p cryotherapy  -Efudex (initiated 7/22/2008, has used multiple times since start date)  3. Rosacea  - Metrogel (initited 7/5/2007)  4. Hirsutism - s/p vaniqa. 2016, normal work up  ____________________________________________    Assessment & Plan:    # History of nonmelanoma skin cancer  -recommend we try again to obtain records     # Milia - right nasal ala, right medial cheek, left chin,   Reviewed the etiology and explained the benign nature of the lesion. Discussed treatment options including topical gel and surgical intervention.  - Apply Differin gel nightly, skip if skin gets dry of flaky.  - Recommended Cetaphil facial wash   - No treatment is necessary at this time unless the lesion changes or becomes symptomatic.     # Sebaceous hyperplasia. Left cheek  - No treatment is necessary at this time unless the lesion changes or becomes symptomatic. 6 month(s) in-person, or earlier for new or changing lesions     Procedures Performed:   None.    Follow-up: 6 months skin exam    Staff and Scribe:     Scribe Disclosure:   I, Dennis Bradley, am serving as a scribe to document services personally performed by this physician, Dr. Meka Holbrook, based on data collection and the provider's statements to me.       Provider Disclosure:   The documentation recorded by the scribe accurately reflects the services I personally performed and the decisions made by me.    Meka Holbrook MD    Department of Dermatology  [facility]: Phone: [phone], Fax:[phone]  [facility]: Phone: [phone], Fax:  [phone]      ____________________________________________    CC: Derm Problem (Area of concern on face/ under mask )    HPI:  Ms. Roberta Joseph is a(n) 72 year old female who presents today as a return patient for a spot check.    Last seen 8/23/22 for a spot check. At that time, patient was a few days from positive PCR and antigen Covid test, rescheduled for spot check in 2-4 weeks.    Today, she would like a lump on the left side of her nose evaluated. She would also like several spots around her nose evaluated. She also notes dryness and irritation around her nose.      Using scrub on face  Patient is otherwise feeling well, without additional skin concerns.    Labs Reviewed:  N/A    Physical Exam:  Vitals: There were no vitals taken for this visit.  SKIN: Focused examination of nose, cheeks was performed.  - Yellow papule with central dell, left medial cheek  - There are white 1-2 mm papules on the right nasal ala, right medial cheek, left chin      - No other lesions of concern on areas examined.     Medications:  Current Outpatient Medications   Medication     acetaminophen (TYLENOL) 325 MG tablet     Calcium Citrate-Vitamin D 200-250 MG-UNIT TABS     diclofenac (VOLTAREN) 1 % topical gel     escitalopram (LEXAPRO) 20 MG tablet     magnesium oxide 200 MG TABS     metroNIDAZOLE (METROGEL) 0.75 % topical gel     Multiple Vitamin (MULTI-VITAMINS) TABS     nabumetone (RELAFEN) 500 MG tablet     Zoledronic Acid (RECLAST IV)     No current facility-administered medications for this visit.      Past Medical History:   Patient Active Problem List   Diagnosis     Dry eyes, bilateral     Hyperopic astigmatism of both eyes     Presbyopia     Past Medical History:   Diagnosis Date     Basal cell carcinoma     2013, left medial cheek     Depression      NCGS (non-celiac gluten sensitivity)      Osteoporosis      Squamous cell carcinoma     s/p Mohs 1996        CC No referring provider defined for this encounter. on  close of this encounter.

## 2022-09-09 NOTE — LETTER
9/9/2022         RE: Roberta Joseph  [address]  [address]  [place] [address]        Dear Colleague,    Thank you for referring your patient, Roberta Joseph, to the [facility]. Please see a copy of my visit note below.    [facility] Dermatology Note  Encounter Date: Sep 9, 2022  Office Visit     Dermatology Problem List:  1. History of NMSC  - BCC, left medial cheek/nasolabial fold, s/p biopsy 8/29/2012, s/p excision 9/10/2013  - SCC, right nose, s/p Mohs 6/1996  2. Actinic keratosis  -s/p cryotherapy  -Efudex (initiated 7/22/2008, has used multiple times since start date)  3. Rosacea  - Metrogel (initited 7/5/2007)  4. Hirsutism - s/p vaniqa. 2016, normal work up  ____________________________________________    Assessment & Plan:    # History of nonmelanoma skin cancer  -recommend we try again to obtain records     # Milia - right nasal ala, right medial cheek, left chin,   Reviewed the etiology and explained the benign nature of the lesion. Discussed treatment options including topical gel and surgical intervention.  - Apply Differin gel nightly, skip if skin gets dry of flaky.  - Recommended Cetaphil facial wash   - No treatment is necessary at this time unless the lesion changes or becomes symptomatic.     # Sebaceous hyperplasia. Left cheek  - No treatment is necessary at this time unless the lesion changes or becomes symptomatic. 6 month(s) in-person, or earlier for new or changing lesions     Procedures Performed:   None.    Follow-up: 6 months skin exam    Staff and Scribe:     Scribe Disclosure:   I, Dennis Bradley, am serving as a scribe to document services personally performed by this physician, Dr. Meka Holbrook, based on data collection and the provider's statements to me.       Provider Disclosure:   The documentation recorded by the scribe accurately reflects the services I personally performed and the decisions made by me.    Meka Holbrook,  MD    Department of Dermatology  [facility]: Phone: [phone], Fax:[phone]  [facility]: Phone: [phone], Fax: [phone]      ____________________________________________    CC: Derm Problem (Area of concern on face/ under mask )    HPI:  Ms. Roberta Joseph is a(n) 72 year old female who presents today as a return patient for a spot check.    Last seen 8/23/22 for a spot check. At that time, patient was a few days from positive PCR and antigen Covid test, rescheduled for spot check in 2-4 weeks.    Today, she would like a lump on the left side of her nose evaluated. She would also like several spots around her nose evaluated. She also notes dryness and irritation around her nose.      Using scrub on face  Patient is otherwise feeling well, without additional skin concerns.    Labs Reviewed:  N/A    Physical Exam:  Vitals: There were no vitals taken for this visit.  SKIN: Focused examination of nose, cheeks was performed.  - Yellow papule with central dell, left medial cheek  - There are white 1-2 mm papules on the right nasal ala, right medial cheek, left chin      - No other lesions of concern on areas examined.     Medications:  Current Outpatient Medications   Medication     acetaminophen (TYLENOL) 325 MG tablet     Calcium Citrate-Vitamin D 200-250 MG-UNIT TABS     diclofenac (VOLTAREN) 1 % topical gel     escitalopram (LEXAPRO) 20 MG tablet     magnesium oxide 200 MG TABS     metroNIDAZOLE (METROGEL) 0.75 % topical gel     Multiple Vitamin (MULTI-VITAMINS) TABS     nabumetone (RELAFEN) 500 MG tablet     Zoledronic Acid (RECLAST IV)     No current facility-administered medications for this visit.      Past Medical History:   Patient Active Problem List   Diagnosis     Dry eyes, bilateral     Hyperopic astigmatism of both eyes     Presbyopia     Past Medical History:    Diagnosis Date     Basal cell carcinoma     2013, left medial cheek     Depression      NCGS (non-celiac gluten sensitivity)      Osteoporosis      Squamous cell carcinoma     s/p Mohs 1996        CC No referring provider defined for this encounter. on close of this encounter.       Again, thank you for allowing me to participate in the care of your patient.        Sincerely,        Meka Holbrook MD

## 2022-09-09 NOTE — PATIENT INSTRUCTIONS
Topical Retinoids    Active ingredient adapalene    What are topical retinoids?  These are medicines that are related to Vitamin A. They are used on the skin.  Retin-A , Renova , Differin , and Tazorac  are brand names.  Come in creams and clear gels  Used to treat skin conditions like pimples (acne), face wrinkling, or dark-colored sunspots    How do I use these medicines?  Wash face and let dry for 15 to 30 minutes.  Use a large pea-size amount of medicine to cover the whole face. Do not put on close to the eyes and lips. Rub in gently.   Start by using every other day. If you have no irritation after a few days, start to use it daily.   You might have too much irritation with daily use. Use it less often until the irritation goes away. Then try to increase slowly to daily use.   Irritation improves over time.  You may use moisturizer if your skin becomes dry. Look for  non-comedogenic  (non-pore plugging) and oil free products.     What are the side effects?  Dryness   Peeling and flaking   Irritation of the skin   Possible increased chance of sunburns. Protect your skin from sunlight. Wear a hat and use a sunscreen with SPF 30 or higher. Your sunscreen should have both UVA and UVB (broad-spectrum) protection.    Who should I call with questions?  [facility]: [phone]   [facility]: [phone]  For urgent needs outside of business hours call the [facility] at [phone] and ask for the dermatology resident on call

## 2022-09-11 ENCOUNTER — HEALTH MAINTENANCE LETTER (OUTPATIENT)
Age: 72
End: 2022-09-11

## 2023-01-23 ENCOUNTER — HEALTH MAINTENANCE LETTER (OUTPATIENT)
Age: 73
End: 2023-01-23

## 2024-02-24 ENCOUNTER — HEALTH MAINTENANCE LETTER (OUTPATIENT)
Age: 74
End: 2024-02-24

## 2025-03-09 ENCOUNTER — HEALTH MAINTENANCE LETTER (OUTPATIENT)
Age: 75
End: 2025-03-09